# Patient Record
Sex: FEMALE | Race: WHITE | NOT HISPANIC OR LATINO | Employment: UNEMPLOYED | URBAN - METROPOLITAN AREA
[De-identification: names, ages, dates, MRNs, and addresses within clinical notes are randomized per-mention and may not be internally consistent; named-entity substitution may affect disease eponyms.]

---

## 2018-07-16 ENCOUNTER — OFFICE VISIT CONVERTED (OUTPATIENT)
Dept: SURGERY | Facility: CLINIC | Age: 38
End: 2018-07-16
Attending: SURGERY

## 2018-07-16 ENCOUNTER — CONVERSION ENCOUNTER (OUTPATIENT)
Dept: SURGERY | Facility: CLINIC | Age: 38
End: 2018-07-16

## 2018-08-21 ENCOUNTER — CONVERSION ENCOUNTER (OUTPATIENT)
Dept: SURGERY | Facility: CLINIC | Age: 38
End: 2018-08-21

## 2018-08-21 ENCOUNTER — OFFICE VISIT CONVERTED (OUTPATIENT)
Dept: SURGERY | Facility: CLINIC | Age: 38
End: 2018-08-21
Attending: SURGERY

## 2021-05-16 VITALS — HEIGHT: 65 IN | RESPIRATION RATE: 14 BRPM | WEIGHT: 210 LBS | BODY MASS INDEX: 34.99 KG/M2

## 2021-05-16 VITALS — RESPIRATION RATE: 14 BRPM | HEIGHT: 65 IN | BODY MASS INDEX: 34.99 KG/M2 | WEIGHT: 210 LBS

## 2021-08-13 ENCOUNTER — TELEPHONE (OUTPATIENT)
Dept: SURGERY | Facility: CLINIC | Age: 41
End: 2021-08-13

## 2021-08-13 ENCOUNTER — PREP FOR SURGERY (OUTPATIENT)
Dept: OTHER | Facility: HOSPITAL | Age: 41
End: 2021-08-13

## 2021-08-13 ENCOUNTER — OFFICE VISIT (OUTPATIENT)
Dept: SURGERY | Facility: CLINIC | Age: 41
End: 2021-08-13

## 2021-08-13 VITALS — WEIGHT: 186 LBS | BODY MASS INDEX: 30.99 KG/M2 | HEIGHT: 65 IN

## 2021-08-13 DIAGNOSIS — Z86.010 HISTORY OF COLONIC POLYPS: ICD-10-CM

## 2021-08-13 DIAGNOSIS — Z12.11 SCREENING FOR MALIGNANT NEOPLASM OF COLON: Primary | ICD-10-CM

## 2021-08-13 DIAGNOSIS — Z80.0 FAMILY HISTORY OF COLON CANCER IN MOTHER: ICD-10-CM

## 2021-08-13 PROCEDURE — S0285 CNSLT BEFORE SCREEN COLONOSC: HCPCS | Performed by: NURSE PRACTITIONER

## 2021-08-13 RX ORDER — SODIUM, POTASSIUM,MAG SULFATES 17.5-3.13G
SOLUTION, RECONSTITUTED, ORAL ORAL
Qty: 354 ML | Refills: 0 | Status: ON HOLD | OUTPATIENT
Start: 2021-08-13 | End: 2021-12-23

## 2021-08-13 RX ORDER — FOLIC ACID 1 MG/1
1 TABLET ORAL DAILY
COMMUNITY

## 2021-08-13 RX ORDER — IBUPROFEN 600 MG/1
600 TABLET ORAL EVERY 8 HOURS PRN
COMMUNITY

## 2021-08-13 RX ORDER — SPIRONOLACTONE 100 MG/1
100 TABLET, FILM COATED ORAL DAILY
COMMUNITY
Start: 2021-06-07

## 2021-08-13 RX ORDER — SODIUM CHLORIDE 0.9 % (FLUSH) 0.9 %
3 SYRINGE (ML) INJECTION EVERY 12 HOURS SCHEDULED
Status: CANCELLED | OUTPATIENT
Start: 2021-08-13

## 2021-08-13 RX ORDER — LEVOTHYROXINE SODIUM 88 UG/1
88 TABLET ORAL DAILY
COMMUNITY

## 2021-08-13 RX ORDER — OMEPRAZOLE 20 MG/1
20 CAPSULE, DELAYED RELEASE ORAL DAILY
COMMUNITY

## 2021-08-13 RX ORDER — BENAZEPRIL HYDROCHLORIDE 10 MG/1
10 TABLET ORAL DAILY
COMMUNITY

## 2021-08-13 RX ORDER — POTASSIUM CHLORIDE 750 MG/1
10 CAPSULE, EXTENDED RELEASE ORAL DAILY
COMMUNITY

## 2021-08-13 NOTE — PROGRESS NOTES
"Chief Complaint  Colonoscopy (no complaints. last 3 yrs ago)    Subjective          Favian Tesfaye presents to [facility] GENERAL SURGERY  Patient is a 40-year-old white/ female that presents to general surgery for colonoscopy consultation.    Patient is allergic to Dilantin, hydrocodone/acetaminophen,carbamazepine.    Patient was referred by Dr. Tobias Pollard.    Denies any abdominal pain, change in bowel habit, rectal bleeding.    Admits to family history of colon cancer with her mother.    Admits to history of colonic polyps.    8/18: colonoscopy ([facility]): Transverse - tubular adenoma.          Objective   Vital Signs:   Ht 165.1 cm (65\")   Wt 84.4 kg (186 lb)   BMI 30.95 kg/m²     Physical Exam  Constitutional:       Appearance: Normal appearance.   HENT:      Head: Normocephalic.   Cardiovascular:      Rate and Rhythm: Normal rate.   Pulmonary:      Effort: Pulmonary effort is normal.   Abdominal:      General: Abdomen is flat.      Palpations: Abdomen is soft.   Skin:     General: Skin is warm and dry.   Neurological:      General: No focal deficit present.      Mental Status: She is alert and oriented to person, place, and time.   Psychiatric:         Mood and Affect: Mood normal.         Thought Content: Thought content normal.        Result Review :                 Assessment and Plan    Diagnoses and all orders for this visit:    1. Screening for malignant neoplasm of colon (Primary)    2. History of colonic polyps    3. Family history of colon cancer in mother    Other orders  -     sodium-potassium-magnesium sulfates (Suprep Bowel Prep Kit) 17.5-3.13-1.6 GM/177ML solution oral solution; Take as directed.  Instructions given in office.  Dispense ;2 bottles  Dispense: 354 mL; Refill: 0        Follow Up   Return for Scheduled colonoscopy with Dr. Lewis on 10/15/2021 at [facility].     Hospital will call with arrival time.      Patient was given instructions and " counseling regarding her condition or for health maintenance advice. Please see specific information pulled into the AVS if appropriate.

## 2021-08-13 NOTE — TELEPHONE ENCOUNTER
Patient said the Suprep isn't covered for colonoscopy with Dr. Lewis, and wants to know if she can do a different prep.

## 2021-11-03 ENCOUNTER — OFFICE VISIT (OUTPATIENT)
Dept: OBSTETRICS AND GYNECOLOGY | Facility: CLINIC | Age: 41
End: 2021-11-03

## 2021-11-03 VITALS
WEIGHT: 182 LBS | HEIGHT: 65 IN | DIASTOLIC BLOOD PRESSURE: 74 MMHG | HEART RATE: 76 BPM | SYSTOLIC BLOOD PRESSURE: 107 MMHG | BODY MASS INDEX: 30.32 KG/M2

## 2021-11-03 DIAGNOSIS — Z01.419 ENCOUNTER FOR GYNECOLOGICAL EXAMINATION WITHOUT ABNORMAL FINDING: Primary | ICD-10-CM

## 2021-11-03 DIAGNOSIS — Z12.31 SCREENING MAMMOGRAM FOR BREAST CANCER: ICD-10-CM

## 2021-11-03 LAB
GLUCOSE UR STRIP-MCNC: NEGATIVE MG/DL
PROT UR STRIP-MCNC: NEGATIVE MG/DL

## 2021-11-03 PROCEDURE — 81002 URINALYSIS NONAUTO W/O SCOPE: CPT | Performed by: NURSE PRACTITIONER

## 2021-11-03 PROCEDURE — G0123 SCREEN CERV/VAG THIN LAYER: HCPCS | Performed by: NURSE PRACTITIONER

## 2021-11-03 PROCEDURE — 99395 PREV VISIT EST AGE 18-39: CPT | Performed by: NURSE PRACTITIONER

## 2021-11-03 NOTE — PROGRESS NOTES
"  HPI:   40 y.o..Presents for well woman exam. Contraception or HRT: Tubal ligation  Menses:   INFREQUENT MENSES SINCE TUBAL LIGATION ,  EXPERIENCES ONLY PINK BLOOD ON TOILET TISSUE WHEN WIPING LASTING 1-2 DAYS, BLEEDING CONTAINED WITH PANTYLINERNone  Last pap normal   Pt has no complaints today.  NO URINARY COMPLAINTS    Past Medical History:   Diagnosis Date   • Chronic allergic rhinitis    • GERD (gastroesophageal reflux disease)    • High blood pressure    • Hypothyroidism       Past Surgical History:   Procedure Laterality Date   • COLONOSCOPY  2018    POLYPS   • CYST REMOVAL      axilla   • GALLBLADDER SURGERY     • GANGLION CYST EXCISION      bilateral cysts   • LIPOMA EXCISION     • TUBAL ABDOMINAL LIGATION        Family History   Problem Relation Age of Onset   • Urolithiasis Brother    • Lung cancer Maternal Grandmother    • Colon cancer Mother 50        PCP: does manage PMHx and preventative labs      PHYSICAL EXAM:  /74   Pulse 76   Ht 165.1 cm (65\")   Wt 82.6 kg (182 lb)   Breastfeeding No   BMI 30.29 kg/m²   General- NAD, alert and oriented, appropriate  Psych- Normal mood, good memory  Neck- No masses, no thyroid enlargement  Lymphatic- No palpable neck, axillary, or groin nodes  CV- Regular rhythm, no murnurs  Resp- CTA to bases, no wheezes  Abdomen- Soft, non distended, non tender, no masses  Breast left-  Bilaterally symmetrical, no masses, non tender, no nipple discharge  Breast right- Bilaterally symmetrical, no masses, non tender, no nipple discharge  External genitalia- Normal female, no lesions  Urethra/meatus- Normal, no masses, non tender, no prolapse  Bladder- Normal, no masses, non tender, no prolapse  Vagina- Normal, no atrophy, no lesions, no discharge, no prolapse  Cvx- Normal, no lesions, no discharge, No cervical motion tenderness lengthy suture hanging in vaginal cavity, originating from ectocervix, reported hx cerclage  Uterus- Normal size, shape & consistency. "  Non tender, mobile, & no prolapse  Adnexa- No mass, non tender  Anus/Rectum/Perineum- Not performed  Ext- No edema, no cyanosis    Skin- No lesions, no rashes, no acanthosis nigricans        ASSESSMENT and PLAN:  WWE    Diagnoses and all orders for this visit:    1. Encounter for gynecological examination without abnormal finding (Primary)  -     POC Urinalysis Dipstick  -     IGP,rfx Aptima HPV All Pth    2. Screening mammogram for breast cancer  -     Mammo Screening Digital Tomosynthesis Bilateral With CAD; Future      Glucose, UA   Date Value Ref Range Status   11/03/2021 Negative Negative, 1000 mg/dL (3+) mg/dL Final      Protein, POC   Date Value Ref Range Status   11/03/2021 Negative Negative mg/dL Final          Preventative:   BREAST HEALTH- Monthly self breast exam importance and how to reviewed. MMG and/or MRI (prn) reviewed per society guidelines and her individual history. Screen: Updated today.  CERVICAL CANCER Screening- Reviewed current ASCCP guidelines for screening w and wo cotest HPV, age specific.  Screen: Updated today.  COLON CANCER Screening- Reviewed current medical society guidelines and options.  Screen:  Already up to date.  VACCINATIONS Recommended: Flu annually.  Importance discussed, risk being unvaccinated reviewed.  Questions answered  Follow up PCP/Specialist PMHx and Labs  Myriad: desires to check with [employer] regarding cost of testing and return if desires testing  s/p FLU vaccine this season    She understands the importance of having any ordered tests to be performed in a timely fashion.  The risks of not performing them include, but are not limited to, advanced cancer stages, bone loss from osteoporosis and/or subsequent increase in morbidity and/or mortality.  She is encouraged to review her results online and/or contact or office if she has questions.     Follow Up:  No follow-ups on file.  FU with her OB surgeon for removal sutures from previous cerclage    Carolabdiel Rojas  APRN  11/03/2021

## 2021-11-09 LAB
CONV .: NORMAL
CYTOLOGIST CVX/VAG CYTO: NORMAL
CYTOLOGY CVX/VAG DOC CYTO: NORMAL
CYTOLOGY CVX/VAG DOC THIN PREP: NORMAL
DX ICD CODE: NORMAL
HIV 1 & 2 AB SER-IMP: NORMAL
OTHER STN SPEC: NORMAL
STAT OF ADQ CVX/VAG CYTO-IMP: NORMAL

## 2021-12-21 NOTE — PRE-PROCEDURE INSTRUCTIONS
Called patient to discuss instructions for laxative and skin prep. Discussed clear liquid diet and prep-op medications. Patient aware she can have synthroid and prilosec. Patient stated understanding. No other issues or concerns noted currently per patient.    Patient is vaccinated for covid-19.

## 2021-12-23 ENCOUNTER — ANESTHESIA (OUTPATIENT)
Dept: GASTROENTEROLOGY | Facility: HOSPITAL | Age: 41
End: 2021-12-23

## 2021-12-23 ENCOUNTER — HOSPITAL ENCOUNTER (OUTPATIENT)
Facility: HOSPITAL | Age: 41
Setting detail: HOSPITAL OUTPATIENT SURGERY
Discharge: HOME | End: 2021-12-23
Attending: SURGERY | Admitting: SURGERY

## 2021-12-23 ENCOUNTER — ANESTHESIA EVENT (OUTPATIENT)
Dept: GASTROENTEROLOGY | Facility: HOSPITAL | Age: 41
End: 2021-12-23

## 2021-12-23 VITALS
HEIGHT: 65 IN | SYSTOLIC BLOOD PRESSURE: 122 MMHG | DIASTOLIC BLOOD PRESSURE: 78 MMHG | OXYGEN SATURATION: 100 % | TEMPERATURE: 97.6 F | WEIGHT: 179.9 LBS | HEART RATE: 58 BPM | RESPIRATION RATE: 14 BRPM | BODY MASS INDEX: 29.97 KG/M2

## 2021-12-23 DIAGNOSIS — Z86.010 HISTORY OF COLONIC POLYPS: ICD-10-CM

## 2021-12-23 DIAGNOSIS — Z12.11 SCREENING FOR MALIGNANT NEOPLASM OF COLON: ICD-10-CM

## 2021-12-23 DIAGNOSIS — Z80.0 FAMILY HISTORY OF COLON CANCER IN MOTHER: ICD-10-CM

## 2021-12-23 PROCEDURE — 88305 TISSUE EXAM BY PATHOLOGIST: CPT | Performed by: SURGERY

## 2021-12-23 PROCEDURE — 25010000002 PROPOFOL 10 MG/ML EMULSION: Performed by: NURSE ANESTHETIST, CERTIFIED REGISTERED

## 2021-12-23 RX ORDER — SODIUM CHLORIDE 0.9 % (FLUSH) 0.9 %
3 SYRINGE (ML) INJECTION EVERY 12 HOURS SCHEDULED
Status: DISCONTINUED | OUTPATIENT
Start: 2021-12-23 | End: 2021-12-23 | Stop reason: HOSPADM

## 2021-12-23 RX ORDER — PROPOFOL 10 MG/ML
VIAL (ML) INTRAVENOUS AS NEEDED
Status: DISCONTINUED | OUTPATIENT
Start: 2021-12-23 | End: 2021-12-23 | Stop reason: SURG

## 2021-12-23 RX ORDER — SODIUM CHLORIDE, SODIUM LACTATE, POTASSIUM CHLORIDE, CALCIUM CHLORIDE 600; 310; 30; 20 MG/100ML; MG/100ML; MG/100ML; MG/100ML
30 INJECTION, SOLUTION INTRAVENOUS CONTINUOUS
Status: DISCONTINUED | OUTPATIENT
Start: 2021-12-23 | End: 2021-12-23 | Stop reason: HOSPADM

## 2021-12-23 RX ORDER — LIDOCAINE HYDROCHLORIDE 20 MG/ML
INJECTION, SOLUTION INFILTRATION; PERINEURAL AS NEEDED
Status: DISCONTINUED | OUTPATIENT
Start: 2021-12-23 | End: 2021-12-23 | Stop reason: SURG

## 2021-12-23 RX ADMIN — PROPOFOL 150 MG: 10 INJECTION, EMULSION INTRAVENOUS at 13:45

## 2021-12-23 RX ADMIN — PROPOFOL 150 MCG/KG/MIN: 10 INJECTION, EMULSION INTRAVENOUS at 13:45

## 2021-12-23 RX ADMIN — LIDOCAINE HYDROCHLORIDE 50 MG: 20 INJECTION, SOLUTION INFILTRATION; PERINEURAL at 13:45

## 2021-12-23 RX ADMIN — SODIUM CHLORIDE, POTASSIUM CHLORIDE, SODIUM LACTATE AND CALCIUM CHLORIDE 30 ML/HR: 600; 310; 30; 20 INJECTION, SOLUTION INTRAVENOUS at 11:49

## 2021-12-23 NOTE — ANESTHESIA PREPROCEDURE EVALUATION
Anesthesia Evaluation     Patient summary reviewed and Nursing notes reviewed   NPO Solid Status: > 8 hours  NPO Liquid Status: > 8 hours           Airway   Mallampati: I  TM distance: >3 FB  Dental      Pulmonary - normal exam   Cardiovascular - normal exam  Exercise tolerance: excellent (>7 METS)    (+) hypertension,       Neuro/Psych  GI/Hepatic/Renal/Endo    (+)  GERD,      Musculoskeletal     Abdominal    Substance History      OB/GYN          Other                        Anesthesia Plan    ASA 2     general and MAC     intravenous induction     Anesthetic plan, all risks, benefits, and alternatives have been provided, discussed and informed consent has been obtained with: patient.

## 2021-12-23 NOTE — ANESTHESIA POSTPROCEDURE EVALUATION
Patient: Favian Tesfaye    Procedure Summary     Date: 12/23/21 Room / Location: [facility] ENDOSCOPY 3 / [facility] ENDOSCOPY    Anesthesia Start: 1341 Anesthesia Stop: 1415    Procedure: COLONOSCOPY with biopsy (N/A ) Diagnosis:       Screening for malignant neoplasm of colon      Family history of colon cancer in mother      History of colonic polyps      (Screening for malignant neoplasm of colon [Z12.11])      (Family history of colon cancer in mother [Z80.0])      (History of colonic polyps [Z86.010])    Surgeons: Live Lewis MD Provider: Carlos Baltazar MD    Anesthesia Type: general, MAC ASA Status: 2          Anesthesia Type: general, MAC    Vitals  Vitals Value Taken Time   /78 12/23/21 1430   Temp 36.4 °C (97.6 °F) 12/23/21 1430   Pulse 58 12/23/21 1430   Resp 14 12/23/21 1430   SpO2 100 % 12/23/21 1430           Post Anesthesia Care and Evaluation    Patient location during evaluation: bedside  Patient participation: complete - patient participated  Level of consciousness: awake  Pain management: adequate  Airway patency: patent  Anesthetic complications: No anesthetic complications  PONV Status: none  Cardiovascular status: acceptable  Respiratory status: acceptable  Hydration status: acceptable    Comments: An Anesthesiologist personally participated in the most demanding procedures (including induction and emergence if applicable) in the anesthesia plan, monitored the course of anesthesia administration at frequent intervals and remained physically present and available for immediate diagnosis and treatment of emergencies.

## 2021-12-23 NOTE — H&P
"Favian Tesfaye presents to [facility] GENERAL SURGERY  Patient is a 40-year-old white/ female that presents to general surgery for colonoscopy consultation.     Patient is allergic to Dilantin, hydrocodone/acetaminophen,carbamazepine.     Patient was referred by Dr. Tobias Pollard.     Denies any abdominal pain, change in bowel habit, rectal bleeding.     Admits to family history of colon cancer with her mother.     Admits to history of colonic polyps.     8/18: colonoscopy ([facility]): Transverse - tubular adenoma.                    Objective      Vital Signs:   Ht 165.1 cm (65\")   Wt 84.4 kg (186 lb)   BMI 30.95 kg/m²     Physical Exam  Constitutional:       Appearance: Normal appearance.   HENT:      Head: Normocephalic.   Cardiovascular:      Rate and Rhythm: Normal rate.   Pulmonary:      Effort: Pulmonary effort is normal.   Abdominal:      General: Abdomen is flat.      Palpations: Abdomen is soft.   Skin:     General: Skin is warm and dry.   Neurological:      General: No focal deficit present.      Mental Status: She is alert and oriented to person, place, and time.   Psychiatric:         Mood and Affect: Mood normal.         Thought Content: Thought content normal.               Result Review    :                      Assessment      Assessment and Plan    Diagnoses and all orders for this visit:     1. Screening for malignant neoplasm of colon (Primary)     2. History of colonic polyps     3. Family history of colon cancer in mother     Other orders  -     sodium-potassium-magnesium sulfates (Suprep Bowel Prep Kit) 17.5-3.13-1.6 GM/177ML solution oral solution; Take as directed.  Instructions given in office.  Dispense ;2 bottles  Dispense: 354 mL; Refill: 0           Follow Up   Return for Scheduled colonoscopy with Dr. Lewis on 10/15/2021 at [facility] [place].      Hospital will call with arrival time.        Patient was given instructions and counseling regarding her " condition or for health maintenance advice. Please see specific information pulled into the AVS if appropriate.

## 2021-12-28 LAB
CYTO UR: NORMAL
LAB AP CASE REPORT: NORMAL
LAB AP CLINICAL INFORMATION: NORMAL
PATH REPORT.FINAL DX SPEC: NORMAL
PATH REPORT.GROSS SPEC: NORMAL

## 2022-01-06 ENCOUNTER — OFFICE VISIT (OUTPATIENT)
Dept: SURGERY | Facility: CLINIC | Age: 42
End: 2022-01-06

## 2022-01-06 VITALS — HEIGHT: 65 IN | BODY MASS INDEX: 30.99 KG/M2 | WEIGHT: 186 LBS

## 2022-01-06 DIAGNOSIS — Z80.0 FAMILY HISTORY OF COLON CANCER IN MOTHER: ICD-10-CM

## 2022-01-06 DIAGNOSIS — Z86.010 HISTORY OF COLONIC POLYPS: Primary | ICD-10-CM

## 2022-01-06 PROCEDURE — 99212 OFFICE O/P EST SF 10 MIN: CPT | Performed by: SURGERY

## 2022-01-06 RX ORDER — FLUTICASONE PROPIONATE 50 MCG
1 SPRAY, SUSPENSION (ML) NASAL DAILY
COMMUNITY

## 2022-01-06 RX ORDER — METRONIDAZOLE 7.5 MG/G
GEL TOPICAL
COMMUNITY

## 2022-01-06 NOTE — PROGRESS NOTES
Chief Complaint: Follow-up    Subjective         History of Present Illness  Favian Tesfaye is a 41 y.o. female presents to [facility] GENERAL SURGERY. The patient is to be seen for follow-up status post colonoscopy. She is doing well following her procedure and is urinating and having normal bowel movements. She does have history of precancerous polyps and significant family history.     Objective     Past Medical History:   Diagnosis Date   • Chronic allergic rhinitis    • GERD (gastroesophageal reflux disease)    • High blood pressure    • Hypothyroidism        Past Surgical History:   Procedure Laterality Date   • CERCLAGE CERVIX     • COLONOSCOPY  2018    POLYPS   • COLONOSCOPY N/A 12/23/2021    Procedure: COLONOSCOPY with biopsy;  Surgeon: Live Lewis MD;  Location: [facility] ENDOSCOPY;  Service: General;  Laterality: N/A;  colon polyp   • CYST REMOVAL      axilla   • GALLBLADDER SURGERY     • GANGLION CYST EXCISION      bilateral cysts   • LIPOMA EXCISION     • TUBAL ABDOMINAL LIGATION           Current Outpatient Medications:   •  benazepril (LOTENSIN) 10 MG tablet, Take 10 mg by mouth Daily., Disp: , Rfl:   •  Cyanocobalamin 1000 MCG/ML kit, Inject 1,000 mg under the skin into the appropriate area as directed Every 14 (Fourteen) Days., Disp: , Rfl:   •  fluticasone (FLONASE) 50 MCG/ACT nasal spray, 1 spray into the nostril(s) as directed by provider Daily., Disp: , Rfl:   •  folic acid (FOLVITE) 1 MG tablet, Take 1 mg by mouth Daily., Disp: , Rfl:   •  ibuprofen (ADVIL,MOTRIN) 600 MG tablet, Take 600 mg by mouth Every 8 (Eight) Hours As Needed., Disp: , Rfl:   •  levothyroxine (Synthroid) 88 MCG tablet, Take 88 mcg by mouth Daily., Disp: , Rfl:   •  omeprazole (priLOSEC) 20 MG capsule, Take 20 mg by mouth Daily., Disp: , Rfl:   •  potassium chloride (MICRO-K) 10 MEQ CR capsule, Take 10 mEq by mouth Daily., Disp: , Rfl:   •  spironolactone (ALDACTONE) 100 MG tablet, Take 100 mg by mouth  Daily., Disp: , Rfl:   •  metroNIDAZOLE (METROGEL) 0.75 % gel, , Disp: , Rfl:     Allergies   Allergen Reactions   • Carbamazepine Hives   • Codeine Unknown - Low Severity   • Dilantin [Phenytoin] Hives   • Hydrocodone-Acetaminophen Itching        Family History   Problem Relation Age of Onset   • Urolithiasis Brother    • Lung cancer Maternal Grandmother    • Colon cancer Mother 50   • Malig Hyperthermia Neg Hx        Social History     Socioeconomic History   • Marital status:    • Number of children: 1   Tobacco Use   • Smoking status: Never Smoker   • Smokeless tobacco: Never Used   Vaping Use   • Vaping Use: Never used   Substance and Sexual Activity   • Alcohol use: Never   • Drug use: Never   • Sexual activity: Defer     Birth control/protection: Surgical        Physical Exam  Constitutional:       General: She is not in acute distress.  Pulmonary:      Comments: Non-labored breathing   Abdominal:      Palpations: Abdomen is soft.        Post Surgical Incision  Surgical wound:     Result Review :               Assessment and Plan      The patient is status post colonoscopy with family history and personal history of polyps. We will plan for a repeat colonoscopy in 3 years. She has been provided with a copy of her pathology report.     Follow Up   No follow-ups on file.  Patient was given instructions and counseling regarding her condition or for health maintenance advice. Please see specific information pulled into the AVS if appropriate.       Transcribed from ambient dictation for Live Lewis MD by Marilyn Galan.  01/06/22   10:49 EST    Patient verbalized consent to the visit recording.

## (undated) DEVICE — COLON KIT: Brand: MEDLINE INDUSTRIES, INC.

## (undated) DEVICE — Device: Brand: DEFENDO AIR/WATER/SUCTION AND BIOPSY VALVE

## (undated) DEVICE — SOL IRRG H2O PL/BG 1000ML STRL

## (undated) DEVICE — SINGLE-USE BIOPSY FORCEPS: Brand: RADIAL JAW 4